# Patient Record
Sex: MALE | Race: WHITE | URBAN - METROPOLITAN AREA
[De-identification: names, ages, dates, MRNs, and addresses within clinical notes are randomized per-mention and may not be internally consistent; named-entity substitution may affect disease eponyms.]

---

## 2017-09-21 ENCOUNTER — OFFICE VISIT (OUTPATIENT)
Dept: FAMILY MEDICINE | Facility: CLINIC | Age: 27
End: 2017-09-21

## 2017-09-21 VITALS
BODY MASS INDEX: 21.75 KG/M2 | HEIGHT: 72 IN | RESPIRATION RATE: 17 BRPM | OXYGEN SATURATION: 99 % | HEART RATE: 90 BPM | SYSTOLIC BLOOD PRESSURE: 124 MMHG | TEMPERATURE: 98.6 F | WEIGHT: 160.6 LBS | DIASTOLIC BLOOD PRESSURE: 86 MMHG

## 2017-09-21 DIAGNOSIS — Z00.00 ROUTINE GENERAL MEDICAL EXAMINATION AT A HEALTH CARE FACILITY: ICD-10-CM

## 2017-09-21 DIAGNOSIS — Z28.9 VACCINATION DELAY: ICD-10-CM

## 2017-09-21 DIAGNOSIS — F41.9 ANXIETY: ICD-10-CM

## 2017-09-21 DIAGNOSIS — Z11.3 ROUTINE SCREENING FOR STI (SEXUALLY TRANSMITTED INFECTION): ICD-10-CM

## 2017-09-21 DIAGNOSIS — F33.1 MAJOR DEPRESSIVE DISORDER, RECURRENT EPISODE, MODERATE (H): Primary | ICD-10-CM

## 2017-09-21 LAB
CHOLEST SERPL-MCNC: 170 MG/DL
ERYTHROCYTE [DISTWIDTH] IN BLOOD BY AUTOMATED COUNT: 13.2 % (ref 10–15)
HCT VFR BLD AUTO: 47.7 % (ref 40–53)
HDLC SERPL-MCNC: 57 MG/DL
HGB BLD-MCNC: 16.8 G/DL (ref 13.3–17.7)
LDLC SERPL CALC-MCNC: 92 MG/DL
MCH RBC QN AUTO: 31.2 PG (ref 26.5–33)
MCHC RBC AUTO-ENTMCNC: 35.2 G/DL (ref 31.5–36.5)
MCV RBC AUTO: 89 FL (ref 78–100)
NONHDLC SERPL-MCNC: 113 MG/DL
PLATELET # BLD AUTO: 179 10E9/L (ref 150–450)
RBC # BLD AUTO: 5.38 10E12/L (ref 4.4–5.9)
TRIGL SERPL-MCNC: 106 MG/DL
TSH SERPL DL<=0.005 MIU/L-ACNC: 2.09 MU/L (ref 0.4–4)
WBC # BLD AUTO: 9.7 10E9/L (ref 4–11)

## 2017-09-21 RX ORDER — SERTRALINE HYDROCHLORIDE 25 MG/1
25 TABLET, FILM COATED ORAL DAILY
Qty: 30 TABLET | Refills: 0 | Status: SHIPPED | OUTPATIENT
Start: 2017-09-21 | End: 2017-10-21

## 2017-09-21 ASSESSMENT — PATIENT HEALTH QUESTIONNAIRE - PHQ9: SUM OF ALL RESPONSES TO PHQ QUESTIONS 1-9: 21

## 2017-09-21 NOTE — MR AVS SNAPSHOT
After Visit Summary   2017    Maurilio Lea    MRN: [id number]           Patient Information     Date Of Birth          1990        Visit Information        Provider Department      2017 7:00 PM Clinician, [facility]        Today's Diagnoses     Major depressive disorder, recurrent episode, moderate (H)    -  1    Anxiety        Vaccination delay        Routine screening for STI (sexually transmitted infection)        Routine general medical examination at a health care facility           Follow-ups after your visit        Who to contact     Please call your clinic at [phone] to:    Ask questions about your health    Make or cancel appointments    Discuss your medicines    Learn about your test results    Speak to your doctor   If you have compliments or concerns about an experience at your clinic, or if you wish to file a complaint, please contact [facility] Patient Relations at [phone] or email us at [email or web address]         Additional Information About Your Visit        MyChart Information     TransGenRxt is an electronic gateway that provides easy, online access to your medical records. With zerved, you can request a clinic appointment, read your test results, renew a prescription or communicate with your care team.     To sign up for TransGenRxt visit the website at [email or web address]   You will be asked to enter the access code listed below, as well as some personal information. Please follow the directions to create your username and password.     Your access code is: [email or web address]  Expires: 2017  8:24 PM     Your access code will  in 90 days. If you need help or a new code, please contact your [facility] or call [phone] for assistance.        Care EveryWhere ID     This is your Care EveryWhere ID. This could be used by other organizations to access your  [place] medical records  [id number]        Your Vitals Were     Pulse Temperature Respirations Height Pulse Oximetry BMI (Body Mass Index)    90 98.6  F (37  C) (Oral) 17 6' (182.9 cm) 99% 21.78 kg/m2       Blood Pressure from Last 3 Encounters:   09/21/17 124/86    Weight from Last 3 Encounters:   09/21/17 160 lb 9.6 oz (72.8 kg)              We Performed the Following     CBC with platelets     HC FLU VAC PRESRV FREE QUAD SPLIT VIR 3+YRS IM     Hemoglobin A1c     Hepatitis B surface brian immune s     Hepatitis B surface antigen     Hepatitis C antibody     INFLUENZA IMMUNIZATION     IZABEL HIV-I AB SCREEN     Lipid panel reflex to direct LDL     Neisseria gonorrhoeae PCR     RPR screen with reflex to confirm     TSH with free T4 reflex          Today's Medication Changes          These changes are accurate as of: 9/21/17  8:25 PM.  If you have any questions, ask your nurse or doctor.               Start taking these medicines.        Dose/Directions    sertraline 25 MG tablet   Commonly known as:  ZOLOFT   Used for:  Major depressive disorder, recurrent episode, moderate (H)   Started by:  Clinician, Pn Ump        Dose:  25 mg   Take 1 tablet (25 mg) by mouth daily   Quantity:  30 tablet   Refills:  0            Where to get your medicines      Some of these will need a paper prescription and others can be bought over the counter.  Ask your nurse if you have questions.     Bring a paper prescription for each of these medications     sertraline 25 MG tablet                Primary Care Provider    None Specified       No primary provider on file.        Equal Access to Services     MARÍA ELENA AGOSTO : Chan Crowell, gonzález smalls, qaybmony alva. So [facility] [phone].    ATENCIÓN: Si habla español, tiene a denise disposición servicios gratuitos de asistencia lingüística. Molly al [phone].    We comply with applicable federal civil rights laws and  [place] laws. We do not discriminate on the basis of race, color, national origin, age, disability sex, sexual orientation or gender identity.            Thank you!     Thank you for choosing [facility]  for your care. Our goal is always to provide you with excellent care. Hearing back from our patients is one way we can continue to improve our services. Please take a few minutes to complete the written survey that you may receive in the mail after your visit with us. Thank you!             Your Updated Medication List - Protect others around you: Learn how to safely use, store and throw away your medicines at www.disposemymeds.org.          This list is accurate as of: 9/21/17  8:25 PM.  Always use your most recent med list.                   Brand Name Dispense Instructions for use Diagnosis    sertraline 25 MG tablet    ZOLOFT    30 tablet    Take 1 tablet (25 mg) by mouth daily    Major depressive disorder, recurrent episode, moderate (H)

## 2017-09-22 LAB
HBA1C MFR BLD: 5.1 % (ref 4.3–6)
HBV SURFACE AB SERPL IA-ACNC: 166.44 M[IU]/ML
HBV SURFACE AG SERPL QL IA: NONREACTIVE
HCV AB SERPL QL IA: NONREACTIVE
HIV 1+2 AB+HIV1 P24 AG SERPL QL IA: NONREACTIVE
N GONORRHOEA DNA SPEC QL NAA+PROBE: NEGATIVE
RPR SER QL: NEGATIVE
SPECIMEN SOURCE: NORMAL

## 2017-09-22 NOTE — PROGRESS NOTES
"[facility] Progress Note    Chief complaint: anxiety      Subjective:     CALIXTO presents to the clinic with complaints of anxiety and perfused sweating at night during sleep. He moved from [place] to [place] 2 weeks ago and is working around 10-12 hours per day. He had a girlfriend and 3 kids, and he said there was a lot of pressure on him to earn enough money for the family. He reported several symptoms of anxiety including heart murmur, fast heart rate and sporadic episodes of anxiety attack. He complained that sometimes he felt like his heart was \"pumping out of his chest\" and this has happened 3 to 5 times per day. He said he always felt cold and had to wear a hoodie all the time to feel warm. He is currently not taking any medication and his vaccination record was up to date. He agreed to having a flu shot during the clinic today.     Allergies and ADRs: NKDA    Caffeine: yes (1 cup of coffee per day)  Tobacco: yes (3 packs per week)  Alcohol: yes (socially)  Illicit drugs: yes (marijuana)    Current Outpatient Prescriptions   Medication Sig Dispense Refill     sertraline (ZOLOFT) 25 MG tablet Take 1 tablet (25 mg) by mouth daily 30 tablet 0         Objective:   /86  Pulse 90  Temp 98.6  F (37  C) (Oral)  Resp 17  Ht 6' (182.9 cm)  Wt 160 lb 9.6 oz (72.8 kg)  SpO2 99%  BMI 21.78 kg/m2    Assessment:     Condition 1- Depression with anxiety  DTP: Needs Additional Therapy: untreated condition   Rationale: The patient scored 6 on PHQ-2 and 21 on PHQ-9, which classified him as \"severe\" on the depression severity scale. The suggested intervention for a score of 21 on PHQ-9 was to initiate prescription drug. Drugs option for the condition are SSRIs, SNRIs, and tricyclic antidepressants.     Also, there might be a problem with the regulation of TSH hormone since he kept feeling cold during the day. STI tests should also be conducted.    Plan:  1. Start sertraline (Zoloft) 25 mg one tablet per day "   2. Give a flu shot during today clinic visit  3. Order a blood test for TSH, CBC, A1c, and lipids  4. Order a urine test for common STIs    Pharmacy Follow-Up Plan (Method, Date, Parameters): Call the patient after 2 weeks (10/6) to assess for effectiveness of sertraline by checking if the patient is having fewer episodes of anxiety attack (3-5 times per day currently). Check if the patient is experiencing any common side effects of sertraline such as nausea, drowsiness, and tiredness. Remind the patient to come back to the clinic again after 1 month for reassessment and lab test results.    PharmCare Clinician: Dax Her  Pharmacy Preceptor: Marium Parrish      _____________________________  Preceptor Use Only:  In supervising the student, I have reviewed and verified the student's documentation and found it to be correct and complete.   Preceptor Signature: Marium Parrish

## 2017-09-22 NOTE — NURSING NOTE
"Patient presents to clinic with complaints of anxiety and desired a physical assessment. He reported night sweats every night. He sweats through the sheets, but does not feel hot or dizzy. His chief complaint concerned anxiety. Patient has experienced anxiety since about 19 years of age, which manifests as physical symptoms like rapid heart beat and shortness of breath. Family history of anxiety (mother). He experiences these episode about 2-3 times a day and states they impair his everyday functioning. Patient also scored 6 on PHQ-2 and 21 on PHQ-9. See screening documentation. He states he \"struggles with sadness.\" MD notified for further assessment. SN Ifrah  "

## 2017-09-23 NOTE — PROGRESS NOTES
"MEDICINE NOTE    SUBJECTIVE:  Maurilio is a 26 yo male presenting to clinic today with anxiety and depression and asking for a general physical examination. He has experienced anxiety since high school, with 3-5 daily episodes of increased HR, SOB, and overall warmth throughout his body. Nothing in particular seems to bring on his anxiety, but he notes that it seems worse when he is stressed about work, money, and family issues. When he was a kid he tried therapy and was prescribed Lexapro, neither of which seemed to relieve his symptoms. More recently he has tried smoking marijuana when he feels anxious, but his anxiety persists. He will wake up in the middle of the night having soaked his bedsheets with sweat, then feel instantly cold when he gets out of bed. In the morning his anxiety is at its worst, and it appears coupled with his depression; Maurilio explained that he feels especially low and down in the mornings, and he has to \"shower it off\" in order to start his day. He approximates that he is getting 4-6 hours of sleep each night. His depressive symptoms seem more pronounced lately, since he moved to [place] from [place] two weeks ago and his missing his girlfriend, their three kids, and his friends.      REVIEW OF SYSTEMS:  Gen: no fevers or weight change, +night sweats, +fatigue  Eyes: no vision changes  Ears, Noses, Mouth, Throat: +ringing in ears, no hearing change, no epistaxis or nasal discharge  Neck: no stiffness  Cardiac: +chest pain and palpitations during anxiety attacks  Lungs: +dyspnea with anxiety attacks  GI: no nausea, vomiting, +diarrhea in mornings  : no change in urine, hematuria, or sexual dysfunction  Musculoskeletal: no joint or muscle pain, stiffness, or swelling  Skin: no concerning lesions or moles  Neuro: no loss of strength or sensation, no numbness or tingling, no tremor, +occasional dizziness  Endo: + temperature intolerance, always feels cold  Heme/Lymph: no bleeding " problems  Allergy: no environmental or drug allergies  Psych: +anxiety and sleep disturbances    No past medical history on file.    No past surgical history on file.    Family History: Maurilio's mom and cousins have a history of anxiety. His mom takes medication for her anxiety.    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 The works mostly takes place at night, and it [place]. He [place] hours and feels like his work stress impacts his family life, and his girlfriend doesn't always understand why he needs to work so much.        OBJECTIVE:  Physical Exam:  /86  Pulse 90  Temp 98.6  F (37  C) (Oral)  Resp 17  Ht 6' (182.9 cm)  Wt 160 lb 9.6 oz (72.8 kg)  SpO2 99%  BMI 21.78 kg/m2  Constitutional: mild distress (seemed cold), comfortable, pleasant   Eyes: anicteric, normal extra-ocular movements   Ears, Nose and Throat: neck supple with full range of motion, no thyromegaly.   Cardiovascular: regular rate and rhythm, normal S1 and S2, no murmurs (he claimed he has a murmur, but could not hear it), rubs or gallops, peripheral pulses full and symmetric   Respiratory: clear to auscultation, no wheezes or crackles, normal breath sounds   Gastrointestinal: positive bowel sounds, nontender, no hepatosplenomegaly, no masses   Musculoskeletal: full range of motion, no edema   Skin: no concerning lesions, no jaundice   Neurological: normal  gait, no tremor   Psychological: appropriate mood   Lymphatic: no cervical  lymphadenopathy    ASSESSMENT/PLAN:  Maurilio was seen today for anxiety.    Diagnoses and all orders for this visit:    Major depressive disorder, recurrent episode, moderate (H)  -     sertraline (ZOLOFT) 25 MG tablet; Take 1 tablet (25 mg) by mouth daily    Anxiety  -     CBC with platelets  -     TSH with free T4 reflex    Vaccination delay  -     INFLUENZA IMMUNIZATION    Routine screening for STI (sexually transmitted infection)  -     RPR screen with reflex to confirm  -     Cancel: Hepatitis B surface brian immune s  -     Hepatitis C antibody  -     Cancel: IZABEL HIV-I AB SCREEN  -     Hepatitis B surface antigen  -     Neisseria gonorrhoeae PCR  -     Hepatitis B Surface Antibody  -     HIV Antigen Antibody Combo    Routine general medical examination at a health care facility  -     Hemoglobin A1c  -     Lipid panel reflex to direct LDL  -     Hepatitis B surface antigen    Other orders  -     HC FLU VAC PRESRV FREE QUAD SPLIT VIR 3+YRS IM  -     Cancel: *UA reflex to Microscopic and Culture ([facility])    Maurilio also received information about local, affordable options for therapy. We discussed that it will take some time to feel the Zoloft's effects. Follow up requested in one month.    Med Clinician: Jaspreet Teixeira, MS2  Preceptor: Dr. Ericka Campbell    In supervising the medical student, I repeated the exam documented above.  I have reviewed and verified the student s documentation.  Supervising Provider: Ducle Campbell MD 9/30/2017     I agree with student note above - patient seen with student team and Dr Ozuna, PGY3.  Start SSRI for anxiety & reassess in 4 weeks to see whether any impact yet noted.  Checking some health maintenance items as well.  Dulce Campbell MD  9/30/2017